# Patient Record
Sex: FEMALE | Race: WHITE
[De-identification: names, ages, dates, MRNs, and addresses within clinical notes are randomized per-mention and may not be internally consistent; named-entity substitution may affect disease eponyms.]

---

## 2019-11-18 ENCOUNTER — HOSPITAL ENCOUNTER (EMERGENCY)
Dept: HOSPITAL 92 - SCSER | Age: 24
Discharge: HOME | End: 2019-11-18
Payer: COMMERCIAL
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
## 2019-11-18 DIAGNOSIS — S80.812A: ICD-10-CM

## 2019-11-18 DIAGNOSIS — S62.511A: Primary | ICD-10-CM

## 2019-11-18 PROCEDURE — 96372 THER/PROPH/DIAG INJ SC/IM: CPT

## 2019-11-18 NOTE — RAD
RIGHT HAND 3 VIEWS:



HISTORY: Injury, right hand pain



FINDINGS:



There is a mildly displaced fracture involving the base of the proximal phalanx of the right thumb wi
th involvement of the articular surface.



Reported By: Rell Sanchez 

Electronically Signed:  11/18/2019 9:59 AM

## 2019-12-20 ENCOUNTER — HOSPITAL ENCOUNTER (OUTPATIENT)
Dept: HOSPITAL 92 - SCSMRI | Age: 24
Discharge: HOME | End: 2019-12-20
Attending: ORTHOPAEDIC SURGERY
Payer: COMMERCIAL

## 2019-12-20 DIAGNOSIS — S62.511A: Primary | ICD-10-CM

## 2019-12-20 DIAGNOSIS — S63.641A: ICD-10-CM

## 2019-12-20 NOTE — MRI
EXAM:

MRI right thumb



PROVIDED CLINICAL HISTORY:

Pain status post injury



COMPARISON:

Radiographs 11/18/2019



FINDINGS:

Displaced intra-articular fracture involving the dorsal base of the thumb proximal phalanx is redemon
strated with associated marrow edema.



The thumb flexor, extensor, abductor and abductor tendons appear intact.



There is a small focus of increased signal intensity on fluid sensitive sequences within the substanc
e of the ulnar collateral ligament and the midportion, suspicious for low-grade partial thickness

interstitial tearing. There is no evidence for full-thickness UCL tear. The thumb MCP radial collater
al ligament appears intact.



The thumb IP joint appears unremarkable. Regional marrow and muscular signal appear otherwise normal.




IMPRESSION:



1. Displaced intra-articular fracture involving the dorsal base of the thumb proximal phalanx.

2. Low-grade partial thickness interstitial tearing involving the ulnar collateral ligament of the th
umb MCP joint.



Reported By: Jose Jaeger 

Electronically Signed:  12/20/2019 11:17 AM

## 2020-01-03 ENCOUNTER — HOSPITAL ENCOUNTER (OUTPATIENT)
Dept: HOSPITAL 92 - SDC | Age: 25
Discharge: HOME | End: 2020-01-03
Attending: ORTHOPAEDIC SURGERY
Payer: COMMERCIAL

## 2020-01-03 VITALS — BODY MASS INDEX: 41.5 KG/M2

## 2020-01-03 DIAGNOSIS — S62.511A: Primary | ICD-10-CM

## 2020-01-03 DIAGNOSIS — S63.641A: ICD-10-CM

## 2020-01-03 DIAGNOSIS — Z91.048: ICD-10-CM

## 2020-01-03 DIAGNOSIS — Z79.899: ICD-10-CM

## 2020-01-03 LAB
BASOPHILS # BLD AUTO: 0 THOU/UL (ref 0–0.2)
BASOPHILS NFR BLD AUTO: 0.5 % (ref 0–1)
EOSINOPHIL # BLD AUTO: 0.2 THOU/UL (ref 0–0.7)
EOSINOPHIL NFR BLD AUTO: 3 % (ref 0–10)
HGB BLD-MCNC: 13.8 G/DL (ref 12–16)
LYMPHOCYTES # BLD: 1.7 THOU/UL (ref 1.2–3.4)
LYMPHOCYTES NFR BLD AUTO: 28.8 % (ref 21–51)
MCH RBC QN AUTO: 29.9 PG (ref 27–31)
MCV RBC AUTO: 92.7 FL (ref 78–98)
MONOCYTES # BLD AUTO: 0.4 THOU/UL (ref 0.11–0.59)
MONOCYTES NFR BLD AUTO: 7 % (ref 0–10)
NEUTROPHILS # BLD AUTO: 3.5 THOU/UL (ref 1.4–6.5)
NEUTROPHILS NFR BLD AUTO: 60.7 % (ref 42–75)
PLATELET # BLD AUTO: 288 THOU/UL (ref 130–400)
PREGS CONTROL BACKGROUND?: (no result)
PREGS CONTROL BAR APPEAR?: YES
RBC # BLD AUTO: 4.6 MILL/UL (ref 4.2–5.4)
WBC # BLD AUTO: 5.7 THOU/UL (ref 4.8–10.8)

## 2020-01-03 PROCEDURE — 84703 CHORIONIC GONADOTROPIN ASSAY: CPT

## 2020-01-03 PROCEDURE — S0020 INJECTION, BUPIVICAINE HYDRO: HCPCS

## 2020-01-03 PROCEDURE — 0PSR04Z REPOSITION RIGHT THUMB PHALANX WITH INTERNAL FIXATION DEVICE, OPEN APPROACH: ICD-10-PCS | Performed by: ORTHOPAEDIC SURGERY

## 2020-01-03 PROCEDURE — 76000 FLUOROSCOPY <1 HR PHYS/QHP: CPT

## 2020-01-03 PROCEDURE — 85025 COMPLETE CBC W/AUTO DIFF WBC: CPT

## 2020-01-03 PROCEDURE — C1713 ANCHOR/SCREW BN/BN,TIS/BN: HCPCS

## 2020-01-03 NOTE — OP
DATE OF PROCEDURE:  01/03/2020



PREOPERATIVE DIAGNOSIS:  Displaced right thumb proximal phalanx base fracture

involving both the insertion of the radial collateral ligament all the way of come

into the dorsal one-half of the joint.  It involved 2/3 of the dorsal base as well,

intra-articular, it had partially healed but displaced. 



POSTOPERATIVE DIAGNOSIS:  Displaced right thumb proximal phalanx base fracture

involving both the insertion of the radial collateral ligament all the way of come

into the dorsal one-half of the joint.  It involved 2/3 of the dorsal base as well,

intra-articular, it had partially healed but displaced. 



PROCEDURES PERFORMED:  

1. Open reduction and internal fixation with callus removal and debridement of this

fracture using K-wire and a 1.5 screw. 

2. C-arm supervision.



TOURNIQUET TIME:  41 minutes.



ESTIMATED BLOOD LOSS:  10 mL.



IMPLANT:  Synthes 1.5 screw lag mold.



DESCRIPTION OF PROCEDURE:  After successful general endotracheal anesthesia, the

limb was prepped and draped.  The patient then had the C-arm brought into the field.

 After prepping and draping, we identified the fragment through the zigzag incision

1 cm on either side of fracture and then exsanguinated the limb.  We then gave the

patient a total of 20 mL of 0.5% Marcaine, 10 before the incision and 10 when we

closed the incision.  We carried the incision through skin and subcutaneous tissue,

identified the retinaculum up to 3 mm radial to the extensor tendons.  We removed it

with a sharp [place] blade, dissected down to the capsule, released it, identified.

We then looked inside the joint and saw the fracture fragment.  First, we used a

[place] blade to separate the partially healed callus and we used a curette to

elevate it approximately 4 mm, which was approximately a millimeter and half more

than its displacement.  It was still hinge partially at its base and we then moved

it slightly proximally and then held it with a clamp and saw that the joint was

nearly anatomic.  We then placed a K-wire through the junction of the mid-third,

__________ and then this held in excellent position along with a tenaculum clamp.

We then passed 1 screw in the lag mold very just articular, but on radiographs did

not appear to enter the joint and it held the remaining portion of the fracture.

There was no instability.  We irrigated, took C-arm, confirmed position  __________

did not appear to be all the way in the joint. 



We then cut the K-wire, obtained hemostasis after releasing tourniquet, closed the

capsule with a running 3-0 Vicryl undyed, closed the retinaculum with a 5-0 Prolene

interrupted mattress with every third locking stitch, and we were able to

approximate the skin after obtaining hemostasis on a 4-0 nylon interrupted mattress

pattern.  The patient left the operating room without evidence of anesthetic or

operative complication after applying a splint. 







Job ID:  [id number]

## 2020-01-03 NOTE — RAD
RIGHT THUMB THREE VIEWS:

 

HISTORY:

ORIF right thumb.

 

FINDINGS:

Steinmann pin and small screw are placed, stabilizing the proximal phalanx fracture of the thumb.

 

IMPRESSION:

Image documentation for internal fixation changes involving the base of the proximal phalanx of the r
ight thumb.

 

POS: [facility]